# Patient Record
Sex: MALE | Race: WHITE | Employment: FULL TIME | URBAN - METROPOLITAN AREA
[De-identification: names, ages, dates, MRNs, and addresses within clinical notes are randomized per-mention and may not be internally consistent; named-entity substitution may affect disease eponyms.]

---

## 2023-08-30 ENCOUNTER — APPOINTMENT (OUTPATIENT)
Dept: GENERAL RADIOLOGY | Age: 58
End: 2023-08-30
Payer: COMMERCIAL

## 2023-08-30 ENCOUNTER — APPOINTMENT (OUTPATIENT)
Dept: CT IMAGING | Age: 58
End: 2023-08-30
Payer: COMMERCIAL

## 2023-08-30 ENCOUNTER — APPOINTMENT (OUTPATIENT)
Dept: MRI IMAGING | Age: 58
End: 2023-08-30
Attending: INTERNAL MEDICINE
Payer: COMMERCIAL

## 2023-08-30 ENCOUNTER — HOSPITAL ENCOUNTER (INPATIENT)
Age: 58
LOS: 2 days | Discharge: HOME | End: 2023-09-01
Attending: INTERNAL MEDICINE | Admitting: INTERNAL MEDICINE
Payer: COMMERCIAL

## 2023-08-30 ENCOUNTER — HOSPITAL ENCOUNTER (EMERGENCY)
Age: 58
Discharge: ANOTHER HOSPITAL | End: 2023-08-30
Attending: EMERGENCY MEDICINE
Payer: COMMERCIAL

## 2023-08-30 VITALS
HEIGHT: 73 IN | DIASTOLIC BLOOD PRESSURE: 78 MMHG | WEIGHT: 265 LBS | TEMPERATURE: 98.3 F | BODY MASS INDEX: 35.12 KG/M2 | HEART RATE: 69 BPM | RESPIRATION RATE: 14 BRPM | SYSTOLIC BLOOD PRESSURE: 141 MMHG | OXYGEN SATURATION: 96 %

## 2023-08-30 DIAGNOSIS — I63.50 POSTERIOR CIRCULATION STROKE (HCC): Primary | ICD-10-CM

## 2023-08-30 DIAGNOSIS — I63.9 ACUTE CVA (CEREBROVASCULAR ACCIDENT) (HCC): Primary | ICD-10-CM

## 2023-08-30 DIAGNOSIS — G47.34 IDIOPATHIC SLEEP RELATED NONOBSTRUCTIVE ALVEOLAR HYPOVENTILATION: ICD-10-CM

## 2023-08-30 DIAGNOSIS — G47.39 OTHER SLEEP APNEA: ICD-10-CM

## 2023-08-30 PROBLEM — I61.9 CVA (CEREBROVASCULAR ACCIDENT DUE TO INTRACEREBRAL HEMORRHAGE) (HCC): Status: ACTIVE | Noted: 2023-08-30

## 2023-08-30 LAB
ALBUMIN SERPL-MCNC: 4.5 G/DL (ref 3.4–5)
ALBUMIN/GLOB SERPL: 2 {RATIO} (ref 1.1–2.2)
ALP SERPL-CCNC: 117 U/L (ref 40–129)
ALT SERPL-CCNC: 56 U/L (ref 10–40)
ANION GAP SERPL CALCULATED.3IONS-SCNC: 15 MMOL/L (ref 3–16)
AST SERPL-CCNC: 41 U/L (ref 15–37)
BASOPHILS # BLD: 0.1 K/UL (ref 0–0.2)
BASOPHILS NFR BLD: 0.6 %
BILIRUB SERPL-MCNC: 0.8 MG/DL (ref 0–1)
BUN SERPL-MCNC: 10 MG/DL (ref 7–20)
CALCIUM SERPL-MCNC: 9.5 MG/DL (ref 8.3–10.6)
CHLORIDE SERPL-SCNC: 101 MMOL/L (ref 99–110)
CO2 SERPL-SCNC: 24 MMOL/L (ref 21–32)
CREAT SERPL-MCNC: 0.7 MG/DL (ref 0.9–1.3)
DEPRECATED RDW RBC AUTO: 12.8 % (ref 12.4–15.4)
EKG ATRIAL RATE: 69 BPM
EKG DIAGNOSIS: NORMAL
EKG P AXIS: 43 DEGREES
EKG P-R INTERVAL: 154 MS
EKG Q-T INTERVAL: 396 MS
EKG QRS DURATION: 100 MS
EKG QTC CALCULATION (BAZETT): 424 MS
EKG R AXIS: 63 DEGREES
EKG T AXIS: 63 DEGREES
EKG VENTRICULAR RATE: 69 BPM
EOSINOPHIL # BLD: 0.3 K/UL (ref 0–0.6)
EOSINOPHIL NFR BLD: 2.4 %
ETHANOLAMINE SERPL-MCNC: NORMAL MG/DL (ref 0–0.08)
GFR SERPLBLD CREATININE-BSD FMLA CKD-EPI: >60 ML/MIN/{1.73_M2}
GLUCOSE BLD-MCNC: 166 MG/DL (ref 70–99)
GLUCOSE SERPL-MCNC: 170 MG/DL (ref 70–99)
HCT VFR BLD AUTO: 46.1 % (ref 40.5–52.5)
HGB BLD-MCNC: 16.1 G/DL (ref 13.5–17.5)
INR PPP: 0.93 (ref 0.84–1.16)
LYMPHOCYTES # BLD: 3 K/UL (ref 1–5.1)
LYMPHOCYTES NFR BLD: 25.7 %
MCH RBC QN AUTO: 33.9 PG (ref 26–34)
MCHC RBC AUTO-ENTMCNC: 35 G/DL (ref 31–36)
MCV RBC AUTO: 96.8 FL (ref 80–100)
MONOCYTES # BLD: 1 K/UL (ref 0–1.3)
MONOCYTES NFR BLD: 8.8 %
NEUTROPHILS # BLD: 7.3 K/UL (ref 1.7–7.7)
NEUTROPHILS NFR BLD: 62.5 %
PERFORMED ON: ABNORMAL
PLATELET # BLD AUTO: 265 K/UL (ref 135–450)
PMV BLD AUTO: 8.7 FL (ref 5–10.5)
POTASSIUM SERPL-SCNC: 3.7 MMOL/L (ref 3.5–5.1)
PROT SERPL-MCNC: 6.8 G/DL (ref 6.4–8.2)
PROTHROMBIN TIME: 12.5 SEC (ref 11.5–14.8)
RBC # BLD AUTO: 4.76 M/UL (ref 4.2–5.9)
SODIUM SERPL-SCNC: 140 MMOL/L (ref 136–145)
TROPONIN, HIGH SENSITIVITY: 10 NG/L (ref 0–22)
WBC # BLD AUTO: 11.6 K/UL (ref 4–11)

## 2023-08-30 PROCEDURE — 36415 COLL VENOUS BLD VENIPUNCTURE: CPT

## 2023-08-30 PROCEDURE — 6360000004 HC RX CONTRAST MEDICATION: Performed by: EMERGENCY MEDICINE

## 2023-08-30 PROCEDURE — 70498 CT ANGIOGRAPHY NECK: CPT

## 2023-08-30 PROCEDURE — 6360000002 HC RX W HCPCS: Performed by: STUDENT IN AN ORGANIZED HEALTH CARE EDUCATION/TRAINING PROGRAM

## 2023-08-30 PROCEDURE — 1200000000 HC SEMI PRIVATE

## 2023-08-30 PROCEDURE — 85025 COMPLETE CBC W/AUTO DIFF WBC: CPT

## 2023-08-30 PROCEDURE — 6360000002 HC RX W HCPCS

## 2023-08-30 PROCEDURE — 93010 ELECTROCARDIOGRAM REPORT: CPT | Performed by: INTERNAL MEDICINE

## 2023-08-30 PROCEDURE — 71045 X-RAY EXAM CHEST 1 VIEW: CPT

## 2023-08-30 PROCEDURE — 70450 CT HEAD/BRAIN W/O DYE: CPT

## 2023-08-30 PROCEDURE — 99285 EMERGENCY DEPT VISIT HI MDM: CPT

## 2023-08-30 PROCEDURE — 2580000003 HC RX 258: Performed by: STUDENT IN AN ORGANIZED HEALTH CARE EDUCATION/TRAINING PROGRAM

## 2023-08-30 PROCEDURE — 80053 COMPREHEN METABOLIC PANEL: CPT

## 2023-08-30 PROCEDURE — 84484 ASSAY OF TROPONIN QUANT: CPT

## 2023-08-30 PROCEDURE — HZ2ZZZZ DETOXIFICATION SERVICES FOR SUBSTANCE ABUSE TREATMENT: ICD-10-PCS | Performed by: INTERNAL MEDICINE

## 2023-08-30 PROCEDURE — 85610 PROTHROMBIN TIME: CPT

## 2023-08-30 PROCEDURE — 82077 ASSAY SPEC XCP UR&BREATH IA: CPT

## 2023-08-30 PROCEDURE — 70551 MRI BRAIN STEM W/O DYE: CPT

## 2023-08-30 PROCEDURE — 93005 ELECTROCARDIOGRAM TRACING: CPT | Performed by: EMERGENCY MEDICINE

## 2023-08-30 RX ORDER — LABETALOL HYDROCHLORIDE 5 MG/ML
5 INJECTION, SOLUTION INTRAVENOUS EVERY 10 MIN PRN
Status: DISCONTINUED | OUTPATIENT
Start: 2023-08-30 | End: 2023-09-01 | Stop reason: HOSPADM

## 2023-08-30 RX ORDER — SODIUM CHLORIDE 0.9 % (FLUSH) 0.9 %
5-40 SYRINGE (ML) INJECTION EVERY 12 HOURS SCHEDULED
Status: DISCONTINUED | OUTPATIENT
Start: 2023-08-30 | End: 2023-09-01 | Stop reason: HOSPADM

## 2023-08-30 RX ORDER — LABETALOL HYDROCHLORIDE 5 MG/ML
20 INJECTION, SOLUTION INTRAVENOUS ONCE
Status: DISCONTINUED | OUTPATIENT
Start: 2023-08-30 | End: 2023-08-30 | Stop reason: HOSPADM

## 2023-08-30 RX ORDER — THIAMINE HYDROCHLORIDE 100 MG/ML
100 INJECTION, SOLUTION INTRAMUSCULAR; INTRAVENOUS ONCE
Status: COMPLETED | OUTPATIENT
Start: 2023-08-30 | End: 2023-08-30

## 2023-08-30 RX ORDER — SODIUM CHLORIDE 9 MG/ML
INJECTION, SOLUTION INTRAVENOUS PRN
Status: DISCONTINUED | OUTPATIENT
Start: 2023-08-30 | End: 2023-09-01 | Stop reason: HOSPADM

## 2023-08-30 RX ORDER — ATENOLOL 50 MG/1
50 TABLET ORAL DAILY
COMMUNITY

## 2023-08-30 RX ORDER — ROSUVASTATIN CALCIUM 20 MG/1
40 TABLET, COATED ORAL NIGHTLY
Status: DISCONTINUED | OUTPATIENT
Start: 2023-08-31 | End: 2023-08-31

## 2023-08-30 RX ORDER — SODIUM CHLORIDE 0.9 % (FLUSH) 0.9 %
5-40 SYRINGE (ML) INJECTION PRN
Status: DISCONTINUED | OUTPATIENT
Start: 2023-08-30 | End: 2023-09-01 | Stop reason: HOSPADM

## 2023-08-30 RX ORDER — ONDANSETRON 2 MG/ML
4 INJECTION INTRAMUSCULAR; INTRAVENOUS EVERY 6 HOURS PRN
Status: DISCONTINUED | OUTPATIENT
Start: 2023-08-30 | End: 2023-09-01 | Stop reason: HOSPADM

## 2023-08-30 RX ORDER — ALLOPURINOL 300 MG/1
300 TABLET ORAL DAILY
COMMUNITY

## 2023-08-30 RX ORDER — ONDANSETRON 4 MG/1
4 TABLET, ORALLY DISINTEGRATING ORAL EVERY 8 HOURS PRN
Status: DISCONTINUED | OUTPATIENT
Start: 2023-08-30 | End: 2023-09-01 | Stop reason: HOSPADM

## 2023-08-30 RX ORDER — ROSUVASTATIN CALCIUM 20 MG/1
40 TABLET, COATED ORAL NIGHTLY
Status: DISCONTINUED | OUTPATIENT
Start: 2023-08-30 | End: 2023-08-30

## 2023-08-30 RX ORDER — POLYETHYLENE GLYCOL 3350 17 G/17G
17 POWDER, FOR SOLUTION ORAL DAILY PRN
Status: DISCONTINUED | OUTPATIENT
Start: 2023-08-30 | End: 2023-09-01 | Stop reason: HOSPADM

## 2023-08-30 RX ORDER — ONDANSETRON 2 MG/ML
INJECTION INTRAMUSCULAR; INTRAVENOUS
Status: COMPLETED
Start: 2023-08-30 | End: 2023-08-30

## 2023-08-30 RX ADMIN — SODIUM CHLORIDE, PRESERVATIVE FREE 10 ML: 5 INJECTION INTRAVENOUS at 21:58

## 2023-08-30 RX ADMIN — SODIUM CHLORIDE, PRESERVATIVE FREE 10 ML: 5 INJECTION INTRAVENOUS at 21:57

## 2023-08-30 RX ADMIN — ONDANSETRON HYDROCHLORIDE 4 MG: 2 INJECTION, SOLUTION INTRAMUSCULAR; INTRAVENOUS at 14:06

## 2023-08-30 RX ADMIN — IOMEPROL INJECTION 75 ML: 714 INJECTION, SOLUTION INTRAVASCULAR at 13:48

## 2023-08-30 RX ADMIN — THIAMINE HYDROCHLORIDE 100 MG: 100 INJECTION, SOLUTION INTRAMUSCULAR; INTRAVENOUS at 21:57

## 2023-08-30 ASSESSMENT — ENCOUNTER SYMPTOMS
COLOR CHANGE: 0
SHORTNESS OF BREATH: 0
VOMITING: 0
SHORTNESS OF BREATH: 0
DIARRHEA: 0
ABDOMINAL PAIN: 0
NAUSEA: 0
BACK PAIN: 0
COUGH: 0
EYE PAIN: 0
WHEEZING: 0
SORE THROAT: 0

## 2023-08-30 ASSESSMENT — LIFESTYLE VARIABLES
HOW OFTEN DO YOU HAVE A DRINK CONTAINING ALCOHOL: MONTHLY OR LESS
HOW MANY STANDARD DRINKS CONTAINING ALCOHOL DO YOU HAVE ON A TYPICAL DAY: 1 OR 2

## 2023-08-30 ASSESSMENT — PAIN - FUNCTIONAL ASSESSMENT
PAIN_FUNCTIONAL_ASSESSMENT: NONE - DENIES PAIN

## 2023-08-30 NOTE — ED NOTES
Pt transported out of ER in stable condition by [facility]. Attempted to call report no answer at this time.       Linda Lee RN  08/30/23 7466

## 2023-08-30 NOTE — CONSULTS
Stroke Team Brief Plan of Care Note:  Discussed Mr. Shital Olmedo' care w/ Dr. Tomas Reddy. Briefly, this is a 56year old gentleman with HTN, who presented with acute onset dizziness starting 1 hour prior to arrival (described dizziness as just inability to keep balance). Of note, had a brief episode (20 seconds) of same type of dizziness early in the morning that completely resolved. On evaluation, NIHSS 0, however, there was probable direction changing nystagmus and truncal ataxia as evidenced by wide based gait and unsteadiness walking (but able to take 3-4 steps without assistance or falling). He had no trouble swallowing. /97, . CTH demonstrated subtle punctate foci of hyperdensity in R and L corona radiata, differential for which was hyperdense clot vs blood. This seems unlikely to represent an intracranial hemorrhage but given relatively mild symptoms (able to walk without assistance and no dysphagia) and CT findings, decision was made not to treat with TNK. Recommended MRI to follow-up for stroke evaluation and further characterization of hyperdensities.      Please do not hesitate to reach out with further concerns or questions ([phone])    Nathanel Kocher, DO   Stroke Team

## 2023-08-30 NOTE — ED NOTES
Pt returned to tx area via cot without incident. Remains alert and without status changes. Pt being interviewed per [facility] Stroke Team via Telest[facility] per their request. Pts wife to bedside.  Dr Vj Anderson remains @ bedside     Evi Soto RN  08/30/23 5155

## 2023-08-30 NOTE — ED PROVIDER NOTES
hallway as we were going to CT revealed that he had very significant nystagmus I think that he may even have component of some rotary nystagmus it is bilateral  Patient gait was not tested initially but was described by EMS as he walked like a drunken  and 2 people had to hang onto him he was moving all 4 extremities and officially his NIH stroke score was 0  Physical exam: Vital signs were slightly hypertensive he was given intravenous labetalol  Continuous cardiac monitoring was performed. Patient's EKG was a sinus rhythm. Patient had no ectopy. No other acute abnormalities were appreciated. Chest x-ray unremarkable. I did talk to the stroke team initially they were planning on giving TNK however after the read from the radiologist came back about a questionable hyperdense lesion it was felt that may be that further work-up in house would be more appropriate I did talk to the transfer center with multiple calls. Finally we did talk to nurse practitioner Haja Kaur at [facility] flew did not think that it was a bleed. But at this point I think we are going to stay firm on the radiology report as being questionable therefore it would not be safe to give TNK. Without further work-up she did agree to accept the patient to be transferred to [facility] for further work-up. I then did talk to the hospitalist with escalating the patient's care to a monitored bed at [facility] Dr. Aleisha Louise patient's labs were all ordered and reviewed. Patient remains symptomatic. We did do teleconferencing with the stroke team also we were able to examine the patient. Patient underwent 55 minutes of critical care time for acute cerebrovascular accident patient is being transferred to [facility] for MRI and further work-up. Patient had no procedure time  EKG was interpreted by myself without the assistance of cardiology. Patient has a normal sinus rhythm. No acute ischemic injury pattern.   No ectopy  Normal sinus rhythm      REASSESSMENT CRITICAL CARE TIME     CONSULTS:  None      PROCEDURES:     Procedures    MEDICATIONS GIVEN THIS VISIT:  Medications   labetalol (NORMODYNE;TRANDATE) injection 20 mg (has no administration in time range)   iomeprol (IOMERON 350) 71.44 % injection 75 mL (75 mLs IntraVENous Given 8/30/23 1348)   ondansetron (ZOFRAN) 4 MG/2ML injection (4 mg  Given 8/30/23 1406)        FINAL IMPRESSION      1. Posterior circulation stroke [facility])            DISPOSITION/PLAN   DISPOSITION Decision To Transfer 08/30/2023 04:28:40 PM          Controlled Substances Monitoring  No flowsheet data found. (Please note that portions of this note were completed with a voice recognition program.  Efforts were made to edit the dictations but occasionally words are mis-transcribed.)    Patient was advised to return to the Emergency Department if there was any worsening.     Benito Jaime MD (electronically signed)  Attending Emergency Physician           Benito Jaime MD  08/30/23 5513

## 2023-08-30 NOTE — ED NOTES
Pt to ER via EMS with reported sudden onset of dizziness with slurred speech, weakness & L sided facial drooping that began @ approx 1257 today. Upon arrival to ER, pt states dizziness remaining, but pt without facial drooping or slurred speech. Dr Aries Prabhakar met EMS upon their entry to ER and NIHSS preformed. Pt taken directly to CT per verbal order of Dr Aries Prabhakar.  Pt taken to CT without incident     Estephania Coronel RN  08/30/23 0424

## 2023-08-31 LAB
ANION GAP SERPL CALCULATED.3IONS-SCNC: 14 MMOL/L (ref 3–16)
BUN SERPL-MCNC: 11 MG/DL (ref 7–20)
CALCIUM SERPL-MCNC: 10.3 MG/DL (ref 8.3–10.6)
CHLORIDE SERPL-SCNC: 101 MMOL/L (ref 99–110)
CHOLEST SERPL-MCNC: 165 MG/DL (ref 0–199)
CO2 SERPL-SCNC: 26 MMOL/L (ref 21–32)
CREAT SERPL-MCNC: 0.8 MG/DL (ref 0.9–1.3)
DEPRECATED RDW RBC AUTO: 13.3 % (ref 12.4–15.4)
GFR SERPLBLD CREATININE-BSD FMLA CKD-EPI: >60 ML/MIN/{1.73_M2}
GLUCOSE SERPL-MCNC: 106 MG/DL (ref 70–99)
HCT VFR BLD AUTO: 42.9 % (ref 40.5–52.5)
HDLC SERPL-MCNC: 34 MG/DL (ref 40–60)
HGB BLD-MCNC: 15.6 G/DL (ref 13.5–17.5)
LDLC SERPL CALC-MCNC: 96 MG/DL
MCH RBC QN AUTO: 35 PG (ref 26–34)
MCHC RBC AUTO-ENTMCNC: 36.3 G/DL (ref 31–36)
MCV RBC AUTO: 96.5 FL (ref 80–100)
PLATELET # BLD AUTO: 188 K/UL (ref 135–450)
PMV BLD AUTO: 8.8 FL (ref 5–10.5)
POTASSIUM SERPL-SCNC: 4.2 MMOL/L (ref 3.5–5.1)
RBC # BLD AUTO: 4.45 M/UL (ref 4.2–5.9)
SODIUM SERPL-SCNC: 141 MMOL/L (ref 136–145)
TRIGL SERPL-MCNC: 177 MG/DL (ref 0–150)
VLDLC SERPL CALC-MCNC: 35 MG/DL
WBC # BLD AUTO: 10.1 K/UL (ref 4–11)

## 2023-08-31 PROCEDURE — 97530 THERAPEUTIC ACTIVITIES: CPT

## 2023-08-31 PROCEDURE — 82746 ASSAY OF FOLIC ACID SERUM: CPT

## 2023-08-31 PROCEDURE — 6370000000 HC RX 637 (ALT 250 FOR IP)

## 2023-08-31 PROCEDURE — 80061 LIPID PANEL: CPT

## 2023-08-31 PROCEDURE — 83036 HEMOGLOBIN GLYCOSYLATED A1C: CPT

## 2023-08-31 PROCEDURE — 97161 PT EVAL LOW COMPLEX 20 MIN: CPT

## 2023-08-31 PROCEDURE — 97535 SELF CARE MNGMENT TRAINING: CPT

## 2023-08-31 PROCEDURE — 2580000003 HC RX 258: Performed by: STUDENT IN AN ORGANIZED HEALTH CARE EDUCATION/TRAINING PROGRAM

## 2023-08-31 PROCEDURE — 97116 GAIT TRAINING THERAPY: CPT

## 2023-08-31 PROCEDURE — 36415 COLL VENOUS BLD VENIPUNCTURE: CPT

## 2023-08-31 PROCEDURE — 84425 ASSAY OF VITAMIN B-1: CPT

## 2023-08-31 PROCEDURE — 84439 ASSAY OF FREE THYROXINE: CPT

## 2023-08-31 PROCEDURE — 2060000000 HC ICU INTERMEDIATE R&B

## 2023-08-31 PROCEDURE — 80048 BASIC METABOLIC PNL TOTAL CA: CPT

## 2023-08-31 PROCEDURE — 97166 OT EVAL MOD COMPLEX 45 MIN: CPT

## 2023-08-31 PROCEDURE — 84443 ASSAY THYROID STIM HORMONE: CPT

## 2023-08-31 PROCEDURE — 92610 EVALUATE SWALLOWING FUNCTION: CPT

## 2023-08-31 PROCEDURE — 85027 COMPLETE CBC AUTOMATED: CPT

## 2023-08-31 RX ORDER — ASPIRIN 81 MG/1
81 TABLET, CHEWABLE ORAL DAILY
Status: DISCONTINUED | OUTPATIENT
Start: 2023-08-31 | End: 2023-09-01 | Stop reason: HOSPADM

## 2023-08-31 RX ORDER — MULTIVITAMIN WITH IRON
1 TABLET ORAL DAILY
Status: DISCONTINUED | OUTPATIENT
Start: 2023-08-31 | End: 2023-09-01 | Stop reason: HOSPADM

## 2023-08-31 RX ORDER — ATENOLOL 50 MG/1
50 TABLET ORAL DAILY
Status: DISCONTINUED | OUTPATIENT
Start: 2023-08-31 | End: 2023-09-01 | Stop reason: HOSPADM

## 2023-08-31 RX ORDER — ATORVASTATIN CALCIUM 80 MG/1
80 TABLET, FILM COATED ORAL NIGHTLY
Status: DISCONTINUED | OUTPATIENT
Start: 2023-08-31 | End: 2023-09-01 | Stop reason: HOSPADM

## 2023-08-31 RX ORDER — ALLOPURINOL 300 MG/1
300 TABLET ORAL DAILY
Status: DISCONTINUED | OUTPATIENT
Start: 2023-08-31 | End: 2023-09-01 | Stop reason: HOSPADM

## 2023-08-31 RX ORDER — GAUZE BANDAGE 2" X 2"
100 BANDAGE TOPICAL DAILY
Status: DISCONTINUED | OUTPATIENT
Start: 2023-08-31 | End: 2023-09-01 | Stop reason: HOSPADM

## 2023-08-31 RX ADMIN — ASPIRIN 81 MG: 81 TABLET, CHEWABLE ORAL at 14:16

## 2023-08-31 RX ADMIN — SODIUM CHLORIDE, PRESERVATIVE FREE 10 ML: 5 INJECTION INTRAVENOUS at 08:19

## 2023-08-31 RX ADMIN — THERA TABS 1 TABLET: TAB at 08:18

## 2023-08-31 RX ADMIN — Medication 100 MG: at 08:18

## 2023-08-31 RX ADMIN — ATENOLOL 50 MG: 50 TABLET ORAL at 08:18

## 2023-08-31 RX ADMIN — SODIUM CHLORIDE, PRESERVATIVE FREE 10 ML: 5 INJECTION INTRAVENOUS at 21:25

## 2023-08-31 RX ADMIN — ALLOPURINOL 300 MG: 300 TABLET ORAL at 08:18

## 2023-08-31 RX ADMIN — ATORVASTATIN CALCIUM 80 MG: 80 TABLET, FILM COATED ORAL at 21:25

## 2023-08-31 NOTE — PLAN OF CARE
Problem: Discharge Planning  Goal: Discharge to home or other facility with appropriate resources  8/31/2023 0746 by Librado Damon RN  Outcome: Progressing   Pt involved in discharge planning. Barriers to discharge discussed with patient. Discharge learning needs identified. Discuss with patient any additional needed resources and transportation plans. Case management following plan of care. Problem: Safety - Adult  Goal: Free from fall injury  8/31/2023 0746 by Librado Damon RN  Outcome: Progressing   All fall precautions in place. Bed locked and in lowest position with alarm on. Overbed table and personal belonings within reach. Call light within reach and patient instructed to use call light for assistance. Non-skid socks on.

## 2023-08-31 NOTE — PROGRESS NOTES
Patient is alert and oriented x4. VSS on RA. Patient is voiding adequately via urinal. No acute neuro changes noted to patient. Patient denies pain. Standard safety measures in place. Plan of care ongoing.

## 2023-08-31 NOTE — PROGRESS NOTES
4 Eyes Skin Assessment     NAME:  Italia Meyers  YOB: 1965  MEDICAL RECORD NUMBER:  [id number]    The patient is being assessed for  Admission    I agree that at least one RN has performed a thorough Head to Toe Skin Assessment on the patient. ALL assessment sites listed below have been assessed. Areas assessed by both nurses:    Head, Face, Ears, Shoulders, Back, Chest, Arms, Elbows, Hands, Sacrum. Buttock, Coccyx, Ischium, Legs. Feet and Heels, Under Medical Devices , and Other          Does the Patient have a Wound?  No noted wound(s)     Birthmark on back   [place]an Prevention initiated by RN: Yes  Wound Care Orders initiated by RN: No    Pressure Injury (Stage 3,4, Unstageable, DTI, NWPT, and Complex wounds) if present, place Wound referral order by RN under : No    New Ostomies, if present place, Ostomy referral order under : No     Nurse 1 eSignature: Electronically signed by Ander Velez RN on 8/31/23 at 1:08 AM EDT    **SHARE this note so that the co-signing nurse can place an eSignature**    Nurse 2 eSignature: Electronically signed by Mono Ralph RN on 8/31/23 at 2:16 AM EDT

## 2023-08-31 NOTE — PROGRESS NOTES
Speech Language Pathology  Facility/Department:[facility] 5T ORTHO/NEURO  Bedside Swallowing  Evaluation  Discharge   Name: Deven Wong  : 1965  MRN: [id number]                                                         Patient Diagnosis(es):   Patient Active Problem List    Diagnosis Date Noted    CVA (cerebrovascular accident due to intracerebral hemorrhage) ([address]) 2023    Acute CVA (cerebrovascular accident) ([address]) 2023       Past Medical History:   Diagnosis Date    Back pain, chronic     Gout     Hypertension     Stutter      Past Surgical History:   Procedure Laterality Date    APPENDECTOMY         Reason for Referral:  Deven Wong  was referred for a Speech Therapy evaluation to assess swallow function and/or communication. History of Present Illness  Per MD notes:  Deven Wong is a 56 y.o. male who presents to the emergency department      Patient presented to the emergency department history of a having a brief episode of dizziness way early this morning around 7 AM.  Lasted less than 20 seconds and then went away but then returned at about 12:30 PM this afternoon finally at about 1250 called [facility] they did bring him out here he had very significant trouble walking to EMS people had to hold onto him he was described as being ataxic like a drunken     Imaging  MRI BRAIN WO CONTRAST   Final Result      1. No evidence of acute ischemia, intracranial hemorrhage, or mass effect. 2.  Small areas of parenchymal signal abnormality in the right greater than left   corona radiata possibly representing small remote infarcts. Date of onset: 23    Current Diet:  ADULT DIET; Regular; Low Sodium (2 gm)      Treatment Diagnosis: dysfluency     Pain:  Denied pain    General:  Chart reviewed: Yes  Behavior/Cognition: WFL  Communication Observation: pt reported a history of stuttering.  Pt with instances of dysfluency, oral groping and facial ticks

## 2023-08-31 NOTE — PROGRESS NOTES
Patient is alert and oriented x4. VSS on room air. No changes in mental status this shift. No complaints of pain this shift, continuing to monitor and manage per [facility]. Ambulating x1 with gait belt. Tolerating well. Patient voiding well via urinal and BRP. One bm today. Patient worked with therapy today and tolerated well, sat up in chair for brief time. Tolerating diet well. Patient is currently resting in bed with bed alarm on for safety. Call light within reach and all fall precautions in place.     Electronically signed by Elaine Ledbetter RN on 8/31/2023 at 6:24 PM

## 2023-08-31 NOTE — NURSE NAVIGATOR
NAME:  Leopold Gimenez BIRTH:  1965  MEDICAL RECORD NUMBER:  [id number]  TODAYS DATE:  8/31/2023    Discussed personal risk factors for Stroke/TIA with patient/family, and ways to reduce the risk for a recurrent stroke. Patient's personal risk factors which were identified are:     [x] Alcohol Abuse: check with your physician before any alcohol consumption - Moderation encouraged   [] Atrial fibrillation: may cause blood clots. [] Drug Abuse: Seek help, talk with your doctor  [] Clotting Disorder  [] Diabetes  [] Family history of stroke or heart disease  [x] High Blood Pressure/Hypertension: work with your physician. [x] High cholesterol: monitor cholesterol levels with your physician. [x] Overweight/Obesity: work with your physician for your ideal body weight. [x] Physical Inactivity: get regular exercise as directed by your physician. [] Personal history of previous TIA or stroke  [x] Poor Diet; decrease salt (sodium) in your diet, follow diet directed by physician. [x] Smoking: Cigarette/Cigar: stop smoking. Pt reports \"vaping\" - cessation encouraged    Advised pt. that you can reduce your risk for stroke/TIA by modifying/controlling the risk factors that you have. Pt.advised to take the medications as prescribed, which will be detailed in the discharge instructions, and to not stop taking them without consulting their physician. In addition, pt. advised to maintain a healthy diet, exercise regularly and to not smoke. [employer] Stroke treatment and prevention, Managing your recovery  notebook  provided and/or reviewed  with patient/family.   The notebook includes, but not limited to, sections addressing warning signs & symptoms of a stroke, which are: sudden numbness or weakness especially on one side of the body, sudden confusion, difficulty speaking or understanding, sudden changes in vision, sudden dizziness or loss of balance/ coordination, sudden severe headache, syncope and

## 2023-08-31 NOTE — PLAN OF CARE
Problem: Discharge Planning  Goal: Discharge to home or other facility with appropriate resources  Outcome: Progressing  Flowsheets (Taken 8/31/2023 0106)  Discharge to home or other facility with appropriate resources:   Identify barriers to discharge with patient and caregiver   Arrange for needed discharge resources and transportation as appropriate     Problem: Safety - Adult  Goal: Free from fall injury  Outcome: Progressing  Flowsheets (Taken 8/31/2023 0106)  Free From Fall Injury:   Instruct family/caregiver on patient safety   Based on caregiver fall risk screen, instruct family/caregiver to ask for assistance with transferring infant if caregiver noted to have fall risk factors

## 2023-08-31 NOTE — CONSULTS
Neurology / Patricia Gains Note      Taran Dos Santos MD is requesting this consult. Reason for Consult: CVA  Admission Chief Complaint: dizziness    History of Present Illness     Susie Falcon III is a 56 y.o. y/o male with history significant for HTN and gout who presented to the ED at [facility] for CC of dizziness. His symptoms first presented early morning resulting in his calling off from work but resolved shortly after and not severe. Dizziness returned around noon [facility]). He described his dizziness as the room spinning. He had trouble with his balance, mild, frontal headache, \"numbness and weakness all over\" and blurry vision but denied any focal weakness, dyarthria, dysphagia or other symptoms. In the [facility] ED, NIHSS was 0. CTH was concerning for thin linear hyperdensity in the right corona radiata, measuring  approximately 7 mm in length, with some minimal surrounding white matter edema. CTA neck was unremarkable. Decision was made to not treat with TNK. Patient was transferred to [facility]. MRI showed no evidence of acute ischemia, intracranial hemorrhage, or mass effect. The small areas of parenchymal signal abnormality in the right greater than left corona radiata likely represented small remote infarcts. Per my interview with the patient, he reports feeling back to normal but is nervous about symptoms returning. He states symptoms had resolved a few hours after arriving to ED. He denies any HA, blurred vision, dysarthria, dysphagia, weakness, numbness, hearing loss, recent viral illness or other complaints this am. He does not recall whether head movements made vertigo worse but reports standing or walking did. He cannot comment on his balance as he has not been out of bed. Of note, he is a former smoker, quit in 2013. Drinks 3-4 beers (maximum 12) daily.     History provided by:  Patient and chart review    REVIEW OF SYSTEMS:   Constitutional- No weight loss or fevers   Neurologic- denies any Metabolic Panel Recent Labs     08/30/23  1333      K 3.7      CO2 24   BUN 10   CREATININE 0.7*   GLUCOSE 170*   CALCIUM 9.5   LABALBU 4.5   ALKPHOS 117   ALT 56*   AST 41*      CBC / Coags Recent Labs     08/30/23  1333   WBC 11.6*   RBC 4.76   HGB 16.1   HCT 46.1      INR 0.93      Other No results for input(s): LABA1C, LDLCALC, TRIG, TSH, YABFOLMO59, FOLATE, LABSALI, COVID19 in the last 72 hours. No results for input(s): PHENYTOIN, KEPPRA, LACOSA, LAMO, VALPROATE, LACTSEPSIS, LACTA in the last 72 hours. CURRENT SCHEDULED MEDICATIONS   Inpatient Medications     multivitamin, 1 tablet, Oral, Daily    thiamine, 100 mg, Oral, Daily    allopurinol, 300 mg, Oral, Daily    atenolol, 50 mg, Oral, Daily    sodium chloride flush, 5-40 mL, IntraVENous, 2 times per day    sodium chloride flush, 5-40 mL, IntraVENous, 2 times per day   Infusions    sodium chloride      sodium chloride        Antibiotics   Recent Abx Admin        No antibiotic orders with administrations found. IMPRESSION & RECOMMENDATIONS     IMPRESSION:  49yoM with PMH HTN who presents with dizziness. MRI showed no acute stroke or hemorrhage but there was concern for small remote infarcts. Unclear if this episode if from BPPV or TIA, although TIA less likely.     RECOMMENDATIONS:  - Neurochecks every 4 hours  - PT/OT  and SLP  - Follow up hemoglobin a1c and lipid panel  - Echo with bubble study  - Start ASA and high intensity statin for secondary prevention given remote stroke  - Follow up as an outpatient      Management and plan discussed with:   Bedside nurse  Dr. Brian Beach MD   Neurology & Neurocritical Care   8/31/2023 7:45 AM    ICU Patients:   [facility]: [facility] Neurocritical Care  Floor / PCU Patients:  [facility]: [facility] Neurology

## 2023-08-31 NOTE — PROGRESS NOTES
Internal Medicine Progress Note    Date: 8/31/2023   Patient: [address] [facility] Day: 1      CC: Dizziness     Interval Hx   Pt states he is shaken up from his episode this morning though denies dizziness at this time. He states his sensation is intact and he feels like his normal self. PT/OT and SLP evaluations revealed no deficits. Echo, Sleep Study, TSH, and neurology consult ordered. HPI:   53yo M with pmhx of htn and gout presents with an episode of dizziness and lightheadedness early this am.  Pt states he was getting up for work when he had a brief episode of right sided headache with dizziness described as the room spinning. He then laid down on the couch for a few minutes with resolution of his symptoms. He denies cp, sob, N/V/D, abd pain, or loc during this episode though he does state that he felt cold and as though something was wrong. This episode was witnessed by his wife who suggested the patient call 911, however, the patient declined. He states he then slept for two hours before getting up to let out his dogs when he began to have a more intense episode of dizziness causing him to have to lean against the doorframe. He describes his dizziness as \"being in a cyclone with his feet spinning\". He denies falls or additional symptoms with this episode and was able to contact EMS on his own. Pt denies prior hx of similar episodes, stroke, MI, or prior head trauma. He drinks 3-4 beers after work each day and had a negative ethanol screen on presentation. 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 MRI on admission did not show acute signs of hemorrhage or ischemia, but did demonstrate small areas of parenchymal abnormalities in the corona radiata bilaterally with the right appearing larger than the left.       Of note:  pt has speech difficulties

## 2023-08-31 NOTE — NURSE NAVIGATOR
Patient's chart reviewed for Stroke Core Measures and additional needs:    [x]   VTE prophylaxis   [x]   Antithrombotic (if applicable) - Order requested by Navigator    [x]   Swallow screen prior to PO intake   [x]   Lipids / A1C ordered or resulted   [x]   Therapy ordered   [x]   Care plan and Education template    Navigator to continue to follow patient while admitted, to assist with follow up and discharge planning as needed.      Nurse eSignature: Electronically signed by Stanley Chandler RN on 8/31/23 at 9:14 AM EDT - Neuroscience Navigator

## 2023-08-31 NOTE — DISCHARGE INSTRUCTIONS
Important Reminders:  Schedule an appointment with your primary care physician, Dr. Pili Madden, within 2 weeks - [phone]. Schedule an appointment with neuropsychiatry, Dr. Aryan Wang, within 2 months - [phone]. Schedule an appointment with the [facility] for a sleep study within 1 month - [phone]. Start taking aspirin 81 mg once per day. Start taking atorvastatin 80 mg once per day. Attached are some ways to help cut back on drinking and smoking. You should return to the emergency department if your symptoms recur, worsen, or do not resolve. In addition, return if:  You have a fever (greater than 101 degrees F),  You have chest pain, shortness of breath, abdominal pain, or uncontrollable vomiting,  You are unable to eat or drink,  You pass out,  You have difficulty moving your arms or legs,   You have difficulty speaking or slurred speech,  Or, you have any concern that you feel needs acute physician evaluation. [facility] Stroke Program Survey  The [address] [place] [address] values your feedback related to your recent hospital visit and admission. We strive to improve our Neuroscience program to promote better outcomes and recoveries for all our patients. The anonymous survey below consists of a few questions that are related to your stay and around your Stroke diagnosis, treatment, and recovery. It is anonymous and has only a few questions. The estimated length of time needed to complete this survey is 3 minutes or less. Thank you for completing this survey!

## 2023-08-31 NOTE — PROGRESS NOTES
Occupational Therapy  Facility/Department: [facility] 5T ORTHO/NEURO  Occupational Therapy Initial Assessment/Treatment    Name: Alec Benson  : 1965  MRN: [id number]  Date of Service: 2023    Discharge Recommendations:   Alec Benson scored a 20/24 on the AM-PAC ADL Inpatient form. Current research shows that an AM-PAC score of 18 or greater is typically associated with a discharge to the patient's home setting. Based on the patient's AM-PAC score, and their current ADL deficits, it is recommended that the patient have 2-3 sessions per week of Occupational Therapy at d/c to increase the patient's independence. At this time, this patient demonstrates the endurance and safety to discharge  home with home services and a follow up treatment frequency of 2-3x/wk. Please see assessment section for further patient specific details. If patient discharges prior to next session this note will serve as a discharge summary. Please see below for the latest assessment towards goals. OT Equipment Recommendations  Equipment Needed: No       Patient Diagnosis(es): There were no encounter diagnoses. Past Medical History:  has a past medical history of Back pain, chronic, Gout, Hypertension, and Stutter. Past Surgical History:  has a past surgical history that includes Appendectomy. Treatment Diagnosis: Impaired functional mobility and ADL      Assessment   Performance deficits / Impairments: Decreased functional mobility ; Decreased ADL status; Decreased balance  Assessment: Pt presents with a mild deline in functional independence. Pt is from home with wife, independent and working. Pt feels most of his symptoms has resolved, but still feels light headed. Currently, pt req CG-SBA for mobility and ADL.   Will follow for acute OT  Treatment Diagnosis: Impaired functional mobility and ADL  Prognosis: Good  Decision Making: Medium Complexity  REQUIRES OT FOLLOW-UP: Yes  Activity Tolerance  Activity Tolerance:

## 2023-08-31 NOTE — PROGRESS NOTES
Physical Therapy  Facility/Department: [address] [place] [address]   Physical Therapy Initial Assessment    Name: Robina Love  : 1965  MRN: [id number]  Date of Service: 2023    Discharge Recommendations:  Robina Love scored a 20/24 on the AM-PAC short mobility form. Current research shows that an AM-PAC score of 18 or greater is typically associated with a discharge to the patient's home setting. Based on the patient's AM-PAC score and their current functional mobility deficits, it is recommended that the patient have 2-3 sessions per week of Physical Therapy at d/c to increase the patient's independence. At this time, this patient demonstrates the endurance and safety to discharge home. Please see assessment section for further patient specific details. If patient discharges prior to next session this note will serve as a discharge summary. Please see below for the latest assessment towards goals. PT Equipment Recommendations  Equipment Needed: No      Patient Diagnosis(es): There were no encounter diagnoses. Past Medical History:  has a past medical history of Back pain, chronic, Gout, Hypertension, and Stutter. Past Surgical History:  has a past surgical history that includes Appendectomy. Assessment   Body Structures, Functions, Activity Limitations Requiring Skilled Therapeutic Intervention: Decreased functional mobility ; Decreased balance  Assessment: Pt is a 56 y.o. with diagnosis of CVA presenting with decreased functional mobility. Pt is currently requiring CGA-SBA for all OOB mobility, which is a decline from reported baseline. Pt will benefit from skilled therapy to maximize safety and independence. Anticipate good progress throughout IP stay. PT recommends 24 hr supervision/assist for increased safety.   Treatment Diagnosis: decreased functional mobility due to CVA  Therapy Prognosis: Good  Decision Making: Low Complexity  Requires PT Follow-Up: Yes  Activity

## 2023-08-31 NOTE — CARE COORDINATION
Case Management Assessment  Initial Evaluation    Date/Time of Evaluation: 8/31/2023 4:16 PM  Assessment Completed by: Jeannine Montoya RN    If patient is discharged prior to next notation, then this note serves as note for discharge by case management. Patient Name: Santhosh Gonzales                   YOB: 1965  Diagnosis: CVA (cerebrovascular accident due to intracerebral hemorrhage) ([address] [facility]) [I61.9]  Acute CVA (cerebrovascular accident) [facility]) [I63.9]                   Date / Time: 8/30/2023  8:06 PM    Patient Admission Status: Inpatient   Readmission Risk (Low < 19, Mod (19-27), High > 27): Readmission Risk Score: 4.3    Current PCP: Valli Ahumada, MD  PCP verified by CM? No    Chart Reviewed: Yes      History Provided by: Patient  Patient Orientation: Alert and Oriented    Patient Cognition: Alert    Hospitalization in the last 30 days (Readmission):  No    If yes, Readmission Assessment in CM Navigator will be completed. Advance Directives:      Code Status: Full Code   Patient's Primary Decision Maker is: Legal Next of Kin      Discharge Planning:    Patient lives with: Spouse/Significant Other Type of Home: House  Primary Care Giver: Self  Patient Support Systems include: Spouse/Significant Other   Current Financial resources: Medicaid  Current community resources: None  Current services prior to admission: None            Current DME:              Type of Home Care services:  None    ADLS  Prior functional level: Independent in ADLs/IADLs  Current functional level: Independent in ADLs/IADLs    PT AM-PAC: 20 /24  OT AM-PAC: 20 /24    Family can provide assistance at DC: Yes  Would you like Case Management to discuss the discharge plan with any other family members/significant others, and if so, who?  No  Plans to Return to Present Housing: Yes  Other Identified Issues/Barriers to RETURNING to current housing: none  Potential Assistance needed at discharge: N/A            Potential DME:

## 2023-09-01 VITALS
HEIGHT: 73 IN | TEMPERATURE: 97.8 F | RESPIRATION RATE: 18 BRPM | OXYGEN SATURATION: 96 % | BODY MASS INDEX: 34.48 KG/M2 | SYSTOLIC BLOOD PRESSURE: 122 MMHG | WEIGHT: 260.14 LBS | HEART RATE: 67 BPM | DIASTOLIC BLOOD PRESSURE: 77 MMHG

## 2023-09-01 LAB
ANION GAP SERPL CALCULATED.3IONS-SCNC: 8 MMOL/L (ref 3–16)
BUN SERPL-MCNC: 11 MG/DL (ref 7–20)
CALCIUM SERPL-MCNC: 9.8 MG/DL (ref 8.3–10.6)
CHLORIDE SERPL-SCNC: 101 MMOL/L (ref 99–110)
CO2 SERPL-SCNC: 30 MMOL/L (ref 21–32)
CREAT SERPL-MCNC: 0.8 MG/DL (ref 0.9–1.3)
EST. AVERAGE GLUCOSE BLD GHB EST-MCNC: 116.9 MG/DL
FOLATE SERPL-MCNC: >20 NG/ML (ref 4.78–24.2)
GFR SERPLBLD CREATININE-BSD FMLA CKD-EPI: >60 ML/MIN/{1.73_M2}
GLUCOSE SERPL-MCNC: 143 MG/DL (ref 70–99)
HBA1C MFR BLD: 5.7 %
LV EF: 53 %
LVEF MODALITY: NORMAL
MAGNESIUM SERPL-MCNC: 2 MG/DL (ref 1.8–2.4)
POTASSIUM SERPL-SCNC: 3.8 MMOL/L (ref 3.5–5.1)
SODIUM SERPL-SCNC: 139 MMOL/L (ref 136–145)
T4 FREE SERPL-MCNC: 1.2 NG/DL (ref 0.9–1.8)
TSH SERPL DL<=0.005 MIU/L-ACNC: 3.71 UIU/ML (ref 0.27–4.2)

## 2023-09-01 PROCEDURE — 80048 BASIC METABOLIC PNL TOTAL CA: CPT

## 2023-09-01 PROCEDURE — 6370000000 HC RX 637 (ALT 250 FOR IP)

## 2023-09-01 PROCEDURE — 36415 COLL VENOUS BLD VENIPUNCTURE: CPT

## 2023-09-01 PROCEDURE — 6360000004 HC RX CONTRAST MEDICATION: Performed by: INTERNAL MEDICINE

## 2023-09-01 PROCEDURE — 2580000003 HC RX 258: Performed by: STUDENT IN AN ORGANIZED HEALTH CARE EDUCATION/TRAINING PROGRAM

## 2023-09-01 PROCEDURE — 97116 GAIT TRAINING THERAPY: CPT

## 2023-09-01 PROCEDURE — C8929 TTE W OR WO FOL WCON,DOPPLER: HCPCS

## 2023-09-01 PROCEDURE — 83735 ASSAY OF MAGNESIUM: CPT

## 2023-09-01 RX ADMIN — ASPIRIN 81 MG: 81 TABLET, CHEWABLE ORAL at 08:18

## 2023-09-01 RX ADMIN — SODIUM CHLORIDE, PRESERVATIVE FREE 10 ML: 5 INJECTION INTRAVENOUS at 08:18

## 2023-09-01 RX ADMIN — THERA TABS 1 TABLET: TAB at 08:18

## 2023-09-01 RX ADMIN — ATENOLOL 50 MG: 50 TABLET ORAL at 08:18

## 2023-09-01 RX ADMIN — PERFLUTREN 1.5 ML: 6.52 INJECTION, SUSPENSION INTRAVENOUS at 11:57

## 2023-09-01 RX ADMIN — SODIUM CHLORIDE, PRESERVATIVE FREE 10 ML: 5 INJECTION INTRAVENOUS at 08:27

## 2023-09-01 RX ADMIN — ALLOPURINOL 300 MG: 300 TABLET ORAL at 08:18

## 2023-09-01 RX ADMIN — Medication 100 MG: at 08:18

## 2023-09-01 NOTE — PLAN OF CARE
Problem: Discharge Planning  Goal: Discharge to home or other facility with appropriate resources  9/1/2023 0757 by Ximena Dyer, APOLLO  Outcome: Progressing   Pt involved in discharge planning. Barriers to discharge discussed with patient. Discharge learning needs identified. Discuss with patient any additional needed resources and transportation plans. Case management following plan of care. Problem: Safety - Adult  Goal: Free from fall injury  9/1/2023 0757 by Ximena Dyer, APOLLO  Outcome: Progressing   All fall precautions in place. Bed locked and in lowest position with alarm on. Overbed table and personal belonings within reach. Call light within reach and patient instructed to use call light for assistance. Non-skid socks on.

## 2023-09-01 NOTE — PROGRESS NOTES
Patient is alert and oriented x4, no complaints of pain, patient up standby assistance. Regular diet. Urinal at bedside. Patient safety measures maintained. Bed alarm on, call light within reach.

## 2023-09-01 NOTE — PROGRESS NOTES
Patient discharged to home with spouse. IV removed and all belongings gathered. Patient educated on follow up care. All questions answered. Patient transported to [facility] for discharge.      Electronically signed by Leopoldo Hooks RN on 9/1/2023 at 3:59 PM

## 2023-09-01 NOTE — PROGRESS NOTES
Patient seen and examined labs and imaging reviewed; reviewed neuro note; awaiting echo results and anticipate dc today with sleep study as outpatient. Discussed case at length with patient and wife prior to discharge. If echo is abnormal will consult cardiology and hold.   Full note to follow

## 2023-09-01 NOTE — CARE COORDINATION
Case Management Assessment            Discharge Note                    Date / Time of Note: 9/1/2023 1:37 PM                  Discharge Note Completed by: Aneta Otero RN    Patient Name: Montana Clark   YOB: 1965  Diagnosis: CVA (cerebrovascular accident due to intracerebral hemorrhage) [facility]) [I61.9]  Acute CVA (cerebrovascular accident) [facility]) [I63.9]   Date / Time: 8/30/2023  8:06 PM    Current PCP: Alfred Melendez MD  Clinic patient: No    Hospitalization in the last 30 days: No       Advance Directives:  Code Status: Full Code  [place] DNR form completed and on chart: Not Indicated    Financial:  Payor: Cha Mis / Plan: Melissa Almaraz / Product Type: *No Product type* /      Pharmacy:    Safia Balderrama [id number] - [address] [phone] [facility] [phone] [address]  Phone: [phone] Fax: [phone]      Assistance purchasing medications?:    Assistance provided by Case Management: None at this time    Does patient want to participate in local refill/ meds to beds program?: Yes    Meds To Beds General Rules:  1. Can ONLY be done Monday- Friday between 8:30am-5pm  2. Prescription(s) must be in pharmacy by 3pm to be filled same day  3. Copy of patient's insurance/ prescription drug card and patient face sheet must be sent along with the prescription(s)  4. Cost of Rx cannot be added to hospital bill. If financial assistance is needed, please contact unit  or ;  or  CANNOT provide pharmacy voucher for patients co-pays  5.  Patients can then  the prescription on their way out of the hospital at discharge, or pharmacy can deliver to the bedside if staff is available. (payment due at time of pick-up or delivery - cash, check, or card accepted)     Able to afford home medications/ co-pay costs: Yes    ADLS:  Current PT AM-PAC Score: 20 /24  Current OT AM-PAC Score: 20 /24      DISCHARGE Disposition: Home- No Services Needed    LOC at discharge: Not Applicable  TEGAN Completed: Not Indicated    Notification completed in HENS/PAS?:  Not Applicable    IMM Completed:   Not Indicated due to: not medicare         Transportation:  Transportation PLAN for discharge: family   Mode of Transport: Private Car  Reason for medical transport: Not Applicable  Name of Transport Company: Not Applicable  Time of Transport: tbd    Transport form completed: Not Indicated    Home Care:  [facility] [employer] [place] [facility] ordered at discharge: Not Indicated  Home Care Agency: Not Applicable  Orders faxed: No    Durable Medical Equipment:  DME Provider: n/a  Equipment obtained during hospitalization: n/a      Additional CM Notes:  CM spoke with patient and spouse at bedside. He plans to return home with spouse, denies any CM needs. Wife to transport home. COVID Result:  No results found for: COVID19    The Plan for Transition of Care is related to the following treatment goals of CVA (cerebrovascular accident due to intracerebral hemorrhage) ([facility]) [I61.9]  Acute CVA (cerebrovascular accident) [facility]) [I63.9]    The Patient and/or patient representative Virlinda Halsted and his family were provided with a choice of provider and agrees with the discharge plan Yes    Freedom of choice list was provided with basic dialogue that supports the patient's individualized plan of care/goals and shares the quality data associated with the providers.  Yes    Care Transitions patient: No    Aneta Otero RN  [facility]  Case Management Department  Ph: [phone]  Fax: [phone]

## 2023-09-01 NOTE — PLAN OF CARE
Problem: Discharge Planning  Goal: Discharge to home or other facility with appropriate resources  Outcome: Progressing  Flowsheets (Taken 8/31/2023 1915)  Discharge to home or other facility with appropriate resources: Identify barriers to discharge with patient and caregiver     Problem: Safety - Adult  Goal: Free from fall injury  Outcome: Progressing  Flowsheets (Taken 8/31/2023 9312)  Free From Fall Injury: Instruct family/caregiver on patient safety     Problem: Neurosensory - Adult  Goal: Achieves stable or improved neurological status  Outcome: Progressing  Goal: Achieves maximal functionality and self care  Outcome: Progressing

## 2023-09-01 NOTE — PROGRESS NOTES
Physical Therapy  Facility/Department: [facility] 5T ORTHO/NEURO  Daily Treatment Note  NAME: Lisa Ceja III  : 1965  MRN: [id number]    Date of Service: 2023    Discharge Recommendations:Malachi Murphy III scored a 20/24 on the AM-PAC short mobility form. Current research shows that an AM-PAC score of 18 or greater is typically associated with a discharge to the patient's home setting. Based on the patient's AM-PAC score and their current functional mobility deficits, it is recommended that the patient have 2-3 sessions per week of Physical Therapy at d/c to increase the patient's independence. At this time, this patient demonstrates the endurance and safety to discharge home with OP services and a follow up treatment frequency of 2-3x/wk. Please see assessment section for further patient specific details. If patient discharges prior to next session this note will serve as a discharge summary. Please see below for the latest assessment towards goals. PT Equipment Recommendations  Equipment Needed: No    Patient Diagnosis(es): The encounter diagnosis was Acute CVA (cerebrovascular accident) ([address]). Assessment   Assessment: Pt with improving mobility; demonstrates willingness to ambulate but session limited by echo coming to get him. Pt with slightly impaired balance from baseline with ambulation. He would benefit from OP PT to improve his balance with functional mobility. Activity Tolerance: Patient tolerated treatment well  Equipment Needed: No     Plan    Physcial Therapy Plan  General Plan: 5-7 times per week  Current Treatment Recommendations: Strengthening;ROM;Balance training;Functional mobility training;Transfer training; Endurance training;Gait training;Stair training;Neuromuscular re-education;Home exercise program;Safety education & training;Patient/Caregiver education & training;Equipment evaluation, education, & procurement; Therapeutic activities     Restrictions  Position Activity Co-treatment   Time In 0930         Time Out 0944         Minutes 14         Timed Code Treatment Minutes: 14 Minutes   Timed Code Treatment Minutes:  14 Minutes    Total Treatment Minutes: 14 minutes       John Varghese, PT

## 2023-09-05 LAB — VIT B1 BLD-MCNC: 307 NMOL/L (ref 70–180)

## 2023-09-06 NOTE — PROGRESS NOTES
Physician Progress Note      Doc Yanes  CSN #:                  [id number]  :                       1965  ADMIT DATE:       2023 8:06 PM  [address] [place] [address] DATE:        2023 3:11 PM  RESPONDING  PROVIDER #:        Leeann Kowalski MD          QUERY TEXT:    Patient admitted with \"Acute dizziness with intense vertigo\" per MD notes on   23. Patient's MRI dated   \"No evidence of acute ischemia, intracranial hemorrhage, or mass effect. Small   areas of parenchymal signal abnormality in the right greater than left corona   radiata possibly representing small remote infarcts. If possible, please   document in the progress notes and discharge summary if CVA was: The medical record reflects the following:  Risk Factors: ?Right corona radiata hyper density, HTN, smoker  Clinical Indicators: per Ayleen Bartholomew MD notes on  -\"presented with acute onset   dizziness starting 1 hour prior to arrival (described  dizziness as just inability to keep balance). Of note, had a brief episode (20   seconds) of same type of dizziness early in the morning  that completely resolved. On evaluation, NIHSS 0, however, there was probable   direction changing nystagmus and truncal ataxia  as evidenced by wide based gait and unsteadiness walking (but able to take 3-4   steps without assistance or falling  Treatment: CBC, CMP, head CT and MRI, ECHO with bubble study (negative for   shunting, Neurology consult, Neuro. checks every  4 hours, PT/OT/ SLP, ECHO with bubble study, \"Start ASA and high intensity   statin for secondary prevention given remote stroke\"  Options provided:  -- CVA treated and resolved  -- CVA ruled out after study  -- TIA treated and resolved  -- Other - I will add my own diagnosis  -- Disagree - Not applicable / Not valid  -- Disagree - Clinically unable to determine / Unknown  -- Refer to Clinical Documentation Reviewer    PROVIDER RESPONSE TEXT:    CVA treated and resolved.     Query created

## 2023-09-22 ENCOUNTER — TELEPHONE (OUTPATIENT)
Dept: ICU | Age: 58
End: 2023-09-22

## 2023-09-22 NOTE — TELEPHONE ENCOUNTER
Neurology recommendations were made to start aspirin 81 mg and atorvastatin 80 mg during 8/30/2023 admission - not ordered on discharging AVS. Needing to verify patient has scheduled follow up appointment with PCP to discuss need for stroke preventative medications.

## 2023-10-30 ENCOUNTER — HOSPITAL ENCOUNTER (EMERGENCY)
Age: 58
Discharge: HOME | End: 2023-10-30
Attending: EMERGENCY MEDICINE
Payer: COMMERCIAL

## 2023-10-30 ENCOUNTER — APPOINTMENT (OUTPATIENT)
Dept: GENERAL RADIOLOGY | Age: 58
End: 2023-10-30
Payer: COMMERCIAL

## 2023-10-30 ENCOUNTER — NURSE ONLY (OUTPATIENT)
Dept: CARDIOLOGY | Age: 58
End: 2023-10-30

## 2023-10-30 ENCOUNTER — APPOINTMENT (OUTPATIENT)
Dept: CT IMAGING | Age: 58
End: 2023-10-30
Payer: COMMERCIAL

## 2023-10-30 ENCOUNTER — APPOINTMENT (OUTPATIENT)
Dept: MRI IMAGING | Age: 58
End: 2023-10-30
Payer: COMMERCIAL

## 2023-10-30 VITALS
OXYGEN SATURATION: 97 % | HEIGHT: 74 IN | TEMPERATURE: 98.2 F | RESPIRATION RATE: 16 BRPM | SYSTOLIC BLOOD PRESSURE: 144 MMHG | HEART RATE: 64 BPM | DIASTOLIC BLOOD PRESSURE: 82 MMHG | WEIGHT: 266.9 LBS | BODY MASS INDEX: 34.25 KG/M2

## 2023-10-30 DIAGNOSIS — R42 DIZZINESS: ICD-10-CM

## 2023-10-30 DIAGNOSIS — R20.2 PARESTHESIA: Primary | ICD-10-CM

## 2023-10-30 LAB
ANION GAP SERPL CALCULATED.3IONS-SCNC: 13 MMOL/L (ref 3–16)
BACTERIA URNS QL MICRO: ABNORMAL /HPF
BASOPHILS # BLD: 0.1 K/UL (ref 0–0.2)
BASOPHILS NFR BLD: 0.7 %
BILIRUB UR QL STRIP.AUTO: NEGATIVE
BUN SERPL-MCNC: 9 MG/DL (ref 7–20)
CALCIUM SERPL-MCNC: 9.6 MG/DL (ref 8.3–10.6)
CHLORIDE SERPL-SCNC: 101 MMOL/L (ref 99–110)
CLARITY UR: CLEAR
CO2 SERPL-SCNC: 24 MMOL/L (ref 21–32)
COLOR UR: YELLOW
CREAT SERPL-MCNC: 0.7 MG/DL (ref 0.9–1.3)
DEPRECATED RDW RBC AUTO: 13.1 % (ref 12.4–15.4)
EKG ATRIAL RATE: 65 BPM
EKG DIAGNOSIS: NORMAL
EKG P AXIS: 18 DEGREES
EKG P-R INTERVAL: 166 MS
EKG Q-T INTERVAL: 390 MS
EKG QRS DURATION: 98 MS
EKG QTC CALCULATION (BAZETT): 405 MS
EKG R AXIS: 52 DEGREES
EKG T AXIS: 71 DEGREES
EKG VENTRICULAR RATE: 65 BPM
EOSINOPHIL # BLD: 0.2 K/UL (ref 0–0.6)
EOSINOPHIL NFR BLD: 2.6 %
GFR SERPLBLD CREATININE-BSD FMLA CKD-EPI: >60 ML/MIN/{1.73_M2}
GLUCOSE BLD-MCNC: 192 MG/DL (ref 70–99)
GLUCOSE SERPL-MCNC: 159 MG/DL (ref 70–99)
GLUCOSE UR STRIP.AUTO-MCNC: NEGATIVE MG/DL
HCT VFR BLD AUTO: 43.1 % (ref 40.5–52.5)
HGB BLD-MCNC: 15 G/DL (ref 13.5–17.5)
HGB UR QL STRIP.AUTO: ABNORMAL
KETONES UR STRIP.AUTO-MCNC: NEGATIVE MG/DL
LEUKOCYTE ESTERASE UR QL STRIP.AUTO: NEGATIVE
LYMPHOCYTES # BLD: 1.7 K/UL (ref 1–5.1)
LYMPHOCYTES NFR BLD: 18.7 %
MCH RBC QN AUTO: 33 PG (ref 26–34)
MCHC RBC AUTO-ENTMCNC: 34.9 G/DL (ref 31–36)
MCV RBC AUTO: 94.7 FL (ref 80–100)
MONOCYTES # BLD: 0.6 K/UL (ref 0–1.3)
MONOCYTES NFR BLD: 6.4 %
NEUTROPHILS # BLD: 6.4 K/UL (ref 1.7–7.7)
NEUTROPHILS NFR BLD: 71.6 %
NITRITE UR QL STRIP.AUTO: NEGATIVE
PERFORMED ON: ABNORMAL
PH UR STRIP.AUTO: 6.5 [PH] (ref 5–8)
PLATELET # BLD AUTO: 214 K/UL (ref 135–450)
PMV BLD AUTO: 8.7 FL (ref 5–10.5)
POTASSIUM SERPL-SCNC: 4 MMOL/L (ref 3.5–5.1)
PROT UR STRIP.AUTO-MCNC: NEGATIVE MG/DL
RBC # BLD AUTO: 4.55 M/UL (ref 4.2–5.9)
RBC #/AREA URNS HPF: ABNORMAL /HPF (ref 0–4)
SODIUM SERPL-SCNC: 138 MMOL/L (ref 136–145)
SP GR UR STRIP.AUTO: 1.01 (ref 1–1.03)
TROPONIN, HIGH SENSITIVITY: <6 NG/L (ref 0–22)
UA COMPLETE W REFLEX CULTURE PNL UR: ABNORMAL
UA DIPSTICK W REFLEX MICRO PNL UR: YES
URN SPEC COLLECT METH UR: ABNORMAL
UROBILINOGEN UR STRIP-ACNC: 0.2 E.U./DL
WBC # BLD AUTO: 8.9 K/UL (ref 4–11)
WBC #/AREA URNS HPF: ABNORMAL /HPF (ref 0–5)

## 2023-10-30 PROCEDURE — 99285 EMERGENCY DEPT VISIT HI MDM: CPT

## 2023-10-30 PROCEDURE — 93005 ELECTROCARDIOGRAM TRACING: CPT | Performed by: STUDENT IN AN ORGANIZED HEALTH CARE EDUCATION/TRAINING PROGRAM

## 2023-10-30 PROCEDURE — 70551 MRI BRAIN STEM W/O DYE: CPT

## 2023-10-30 PROCEDURE — 36415 COLL VENOUS BLD VENIPUNCTURE: CPT

## 2023-10-30 PROCEDURE — 85025 COMPLETE CBC W/AUTO DIFF WBC: CPT

## 2023-10-30 PROCEDURE — 84484 ASSAY OF TROPONIN QUANT: CPT

## 2023-10-30 PROCEDURE — 80048 BASIC METABOLIC PNL TOTAL CA: CPT

## 2023-10-30 PROCEDURE — 81001 URINALYSIS AUTO W/SCOPE: CPT

## 2023-10-30 PROCEDURE — 99222 1ST HOSP IP/OBS MODERATE 55: CPT | Performed by: NURSE PRACTITIONER

## 2023-10-30 PROCEDURE — 71046 X-RAY EXAM CHEST 2 VIEWS: CPT

## 2023-10-30 ASSESSMENT — PAIN - FUNCTIONAL ASSESSMENT: PAIN_FUNCTIONAL_ASSESSMENT: NONE - DENIES PAIN

## 2023-10-30 NOTE — CONSULTS
NEUROLOGY / NEUROCRITICAL CARE CONSULT NOTE       Patient: Alec Benson MRN: [de-identified]    YOB: 1965  Age: 66 y.o. Sex: male   Unit: [facility] EMERGENCY DEPT Room/Bed: A08/A08-08 Location: [address] [place] [address]    Date of Consultation: 10/30/2023  Date of Admission: 10/30/2023  9:48 AM ( LOS: 0 days )  Admitting Physician:     Primary Care Physician: Annette Altman MD   Consult Requested By: Juan Hillman    Reason for Consult: \"TIA\"         IMPRESSION & RECOMMENDATIONS     IMPRESSION:  Patient is a 65 y/o M w/ lightheaded episode earlier today. Has had these events at least once a day for a while. He states they typically last about 10 seconds, this one was while he was at work and lasted longer. Had prior admission for TIA - ECHO w/ good EF, no PFO  On ASA and lipitor   Prior LDL 93, prior A1C was WNL  Saw Dr. Liz Potter in follow up  MRI completed today was negative for acute stroke    RECOMMENDATIONS / PLAN:  Recommend cardiac monitor for 30 days - notified cardiac nurse  No need for further inpatient w/u unless needed from a cardiac standpoint  Continue taking ASA and statin   Continue secondary stroke prevention   Can follow up w/ PCP for secondary stroke prevention  Can f/u w/ Dr. Erasmo Villagran as needed   We will sign off. Okay for discharge from neurology standpoint      Management and plan discussed with:  Nursing staff  Dr. Gema Del Castillo, APRN - CNP   Neurology & Neurocritical Care   10/30/2023 4:45 PM    History of Present Illness     Yakelin Macdonald III is a 66 y.o. y/o male with Prior TIA, HTN, HLD who presents with lightheadedness that occurred while he was working today and was associated w/ numbness in bilateral armpit areas. All symptoms have resolved. They had resolved prior to presenting to hospital. He states he has this occur nearly daily, but typically only last for a few seconds.  He had prior admission w/ TIA w/u, he has old stroke on MRI, but no new

## 2023-10-30 NOTE — DISCHARGE INSTRUCTIONS
CAM MONITOR INSTRUCTIONS    Patient instructions for CAM monitor: You will need to wear monitor for 2 weeks. Mail monitor back or return to office on following date. Remove date:  [address]  [address]  [address] [place] [address] [facility],[address]  [phone]         Make sure to save box as you will place monitor back in postage paid box to return to company. After removing monitor stick it to template provided, place both your log and monitor in box and place in mailbox. If monitor comes off but has been in place at least 7 days place in box and mail back. If it comes off sooner than 7 days you will have to call office and return to have it replaced. Avoid excess sweating to maximize wear time. You are able to shower after 24 hours, however have majority of water hitting back and not directly on monitor. Do not submerge in bath. If you experience any symptoms while wearing monitor push button and record in booklet.       For questions about monitor call: Customer Service [phone]

## 2023-10-30 NOTE — ED NOTES
.Patient prepared for and ready to be discharged. Patient discharged at this time to home in care of self in no acute distress after verbalizing understanding of discharge instructions. Patient left after receiving After Visit Summary instructions. IV removed prior to discharge.      Anton Donald RN  10/30/23 8981

## 2023-10-30 NOTE — PROGRESS NOTES
2 week CAM monitor # [id number] applied per order Douglas Griffith CNP for TIA . Instructions given and questions answered. Bedside nurse aware. Please sent results to neuro group and patients PCP Stewart Sood at [facility].

## 2023-10-31 PROBLEM — R42 LIGHTHEADEDNESS: Status: ACTIVE | Noted: 2023-10-31

## 2023-11-10 ENCOUNTER — HOSPITAL ENCOUNTER (EMERGENCY)
Age: 58
Discharge: HOME | End: 2023-11-10
Attending: EMERGENCY MEDICINE
Payer: COMMERCIAL

## 2023-11-10 ENCOUNTER — APPOINTMENT (OUTPATIENT)
Dept: GENERAL RADIOLOGY | Age: 58
End: 2023-11-10
Payer: COMMERCIAL

## 2023-11-10 VITALS
SYSTOLIC BLOOD PRESSURE: 134 MMHG | BODY MASS INDEX: 35.12 KG/M2 | RESPIRATION RATE: 18 BRPM | OXYGEN SATURATION: 100 % | WEIGHT: 265 LBS | TEMPERATURE: 98.3 F | HEART RATE: 74 BPM | DIASTOLIC BLOOD PRESSURE: 80 MMHG | HEIGHT: 73 IN

## 2023-11-10 DIAGNOSIS — R47.9 DIFFICULTY WITH SPEECH: ICD-10-CM

## 2023-11-10 DIAGNOSIS — E16.2 HYPOGLYCEMIA: Primary | ICD-10-CM

## 2023-11-10 DIAGNOSIS — R26.81 UNSTEADY GAIT: ICD-10-CM

## 2023-11-10 LAB
ALBUMIN SERPL-MCNC: 4.5 G/DL (ref 3.4–5)
ALBUMIN/GLOB SERPL: 1.6 {RATIO} (ref 1.1–2.2)
ALP SERPL-CCNC: 123 U/L (ref 40–129)
ALT SERPL-CCNC: 27 U/L (ref 10–40)
ANION GAP SERPL CALCULATED.3IONS-SCNC: 10 MMOL/L (ref 3–16)
AST SERPL-CCNC: 23 U/L (ref 15–37)
BASOPHILS # BLD: 0.1 K/UL (ref 0–0.2)
BASOPHILS NFR BLD: 0.8 %
BILIRUB SERPL-MCNC: 0.6 MG/DL (ref 0–1)
BILIRUB UR QL STRIP.AUTO: NEGATIVE
BUN SERPL-MCNC: 13 MG/DL (ref 7–20)
CALCIUM SERPL-MCNC: 10.1 MG/DL (ref 8.3–10.6)
CHLORIDE SERPL-SCNC: 102 MMOL/L (ref 99–110)
CHP ED QC CHECK: 159
CLARITY UR: CLEAR
CO2 SERPL-SCNC: 28 MMOL/L (ref 21–32)
COLOR UR: YELLOW
CREAT SERPL-MCNC: 0.9 MG/DL (ref 0.9–1.3)
DEPRECATED RDW RBC AUTO: 13 % (ref 12.4–15.4)
EOSINOPHIL # BLD: 0.4 K/UL (ref 0–0.6)
EOSINOPHIL NFR BLD: 4.5 %
ETHANOLAMINE SERPL-MCNC: NORMAL MG/DL (ref 0–0.08)
GFR SERPLBLD CREATININE-BSD FMLA CKD-EPI: >60 ML/MIN/{1.73_M2}
GLUCOSE BLD-MCNC: 150 MG/DL (ref 70–99)
GLUCOSE BLD-MCNC: 159 MG/DL (ref 70–99)
GLUCOSE SERPL-MCNC: 148 MG/DL (ref 70–99)
GLUCOSE UR STRIP.AUTO-MCNC: NEGATIVE MG/DL
HCT VFR BLD AUTO: 42.3 % (ref 40.5–52.5)
HGB BLD-MCNC: 15 G/DL (ref 13.5–17.5)
HGB UR QL STRIP.AUTO: ABNORMAL
HYALINE CASTS #/AREA URNS LPF: ABNORMAL /LPF (ref 0–2)
INR PPP: 0.94 (ref 0.84–1.16)
KETONES UR STRIP.AUTO-MCNC: NEGATIVE MG/DL
LACTATE BLDV-SCNC: 1.1 MMOL/L (ref 0.4–2)
LEUKOCYTE ESTERASE UR QL STRIP.AUTO: NEGATIVE
LYMPHOCYTES # BLD: 2.3 K/UL (ref 1–5.1)
LYMPHOCYTES NFR BLD: 28.5 %
MAGNESIUM SERPL-MCNC: 2 MG/DL (ref 1.8–2.4)
MCH RBC QN AUTO: 33.1 PG (ref 26–34)
MCHC RBC AUTO-ENTMCNC: 35.4 G/DL (ref 31–36)
MCV RBC AUTO: 93.6 FL (ref 80–100)
MONOCYTES # BLD: 0.8 K/UL (ref 0–1.3)
MONOCYTES NFR BLD: 9.9 %
MUCOUS THREADS #/AREA URNS LPF: ABNORMAL /LPF
NEUTROPHILS # BLD: 4.5 K/UL (ref 1.7–7.7)
NEUTROPHILS NFR BLD: 56.3 %
NITRITE UR QL STRIP.AUTO: NEGATIVE
PERFORMED ON: ABNORMAL
PERFORMED ON: ABNORMAL
PH UR STRIP.AUTO: 5.5 [PH] (ref 5–8)
PLATELET # BLD AUTO: 192 K/UL (ref 135–450)
PMV BLD AUTO: 8.7 FL (ref 5–10.5)
POTASSIUM SERPL-SCNC: 4 MMOL/L (ref 3.5–5.1)
PROT SERPL-MCNC: 7.3 G/DL (ref 6.4–8.2)
PROT UR STRIP.AUTO-MCNC: ABNORMAL MG/DL
PROTHROMBIN TIME: 12.6 SEC (ref 11.5–14.8)
RBC # BLD AUTO: 4.51 M/UL (ref 4.2–5.9)
RBC #/AREA URNS HPF: ABNORMAL /HPF (ref 0–4)
SODIUM SERPL-SCNC: 140 MMOL/L (ref 136–145)
SP GR UR STRIP.AUTO: <=1.005 (ref 1–1.03)
TROPONIN, HIGH SENSITIVITY: 7 NG/L (ref 0–22)
UA COMPLETE W REFLEX CULTURE PNL UR: ABNORMAL
UA DIPSTICK W REFLEX MICRO PNL UR: YES
URN SPEC COLLECT METH UR: ABNORMAL
UROBILINOGEN UR STRIP-ACNC: 0.2 E.U./DL
WBC # BLD AUTO: 8 K/UL (ref 4–11)
WBC #/AREA URNS HPF: ABNORMAL /HPF (ref 0–5)

## 2023-11-10 PROCEDURE — 99285 EMERGENCY DEPT VISIT HI MDM: CPT

## 2023-11-10 PROCEDURE — 71045 X-RAY EXAM CHEST 1 VIEW: CPT

## 2023-11-10 PROCEDURE — 82077 ASSAY SPEC XCP UR&BREATH IA: CPT

## 2023-11-10 PROCEDURE — 83735 ASSAY OF MAGNESIUM: CPT

## 2023-11-10 PROCEDURE — 84484 ASSAY OF TROPONIN QUANT: CPT

## 2023-11-10 PROCEDURE — 83605 ASSAY OF LACTIC ACID: CPT

## 2023-11-10 PROCEDURE — 85610 PROTHROMBIN TIME: CPT

## 2023-11-10 PROCEDURE — 85025 COMPLETE CBC W/AUTO DIFF WBC: CPT

## 2023-11-10 PROCEDURE — 80053 COMPREHEN METABOLIC PANEL: CPT

## 2023-11-10 PROCEDURE — 93005 ELECTROCARDIOGRAM TRACING: CPT | Performed by: EMERGENCY MEDICINE

## 2023-11-10 PROCEDURE — 36415 COLL VENOUS BLD VENIPUNCTURE: CPT

## 2023-11-10 PROCEDURE — 2580000003 HC RX 258: Performed by: EMERGENCY MEDICINE

## 2023-11-10 PROCEDURE — 81001 URINALYSIS AUTO W/SCOPE: CPT

## 2023-11-10 RX ORDER — 0.9 % SODIUM CHLORIDE 0.9 %
500 INTRAVENOUS SOLUTION INTRAVENOUS ONCE
Status: COMPLETED | OUTPATIENT
Start: 2023-11-10 | End: 2023-11-10

## 2023-11-10 RX ADMIN — SODIUM CHLORIDE 500 ML: 9 INJECTION, SOLUTION INTRAVENOUS at 06:35

## 2023-11-10 ASSESSMENT — ENCOUNTER SYMPTOMS
ABDOMINAL PAIN: 0
PHOTOPHOBIA: 0
SHORTNESS OF BREATH: 0

## 2023-11-10 ASSESSMENT — PAIN - FUNCTIONAL ASSESSMENT
PAIN_FUNCTIONAL_ASSESSMENT: NONE - DENIES PAIN

## 2023-11-10 NOTE — ED PROVIDER NOTES
[facility]        Pt Name: Tony Mullen  MRN: [de-identified]  [address] [facility] [place] 1965  Date of evaluation: 11/10/2023  Provider: Gurvinder Harley MD  PCP: Satnam Stoner MD      CHIEF COMPLAINT       Chief Complaint   Patient presents with    Hypoglycemia     Pt via squad for \"stroke like symptoms\" reported by pt's wife after waking up this AM at [phone]. BGL 58 per squad upon arrival.  Oral glucose adm. 159 here. NIH 0   Trouble with speech, unsteady gait    HISTORY OFPRESENT ILLNESS   (Location/Symptom, Timing/Onset, Context/Setting, Quality, Duration, Modifying Factors,Severity)  Note limiting factors. Tony Mullen is a 66 y.o. male presenting today due to concern for being called out due to concern for possibility of a stroke. EMS reports when they arrived his glucose was 56 and ultimately did receive oral glucose due to this. He went to bed at 9:30 PM and woke up at 4:10 AM with the symptoms at that time. He reportedly has a history of TIAs. He states that he normally has a speech impediment and did seem to be speaking to me without any issues even with this reported history. He denies any chest pain. He states in the past he has had issues with high glucose but has never been told he had hypoglycemia. EMS does report he was able to walk on the scene but was unsteady with his gait. He does take a baby aspirin but no blood thinners. Due to concern for possible strokelike symptoms, he was brought to the ED for further evaluation. I did evaluate him initially at [phone] right when he came in to the ED from EMS. REVIEW OF SYSTEMS    (2-9 systems for level 4, 10 or more for level 5)     Review of Systems   Constitutional:  Negative for fatigue and fever. Eyes:  Negative for photophobia and visual disturbance. Respiratory:  Negative for shortness of breath. Cardiovascular:  Negative for chest pain.    Gastrointestinal:  Negative for

## 2023-11-10 NOTE — ED PROVIDER NOTES
This patient was signed out to me at 8:10 AM. Please reference the off going provider's note for initial assessment and plan. This patient is signed out pending the following:    - reassess clinical condition  - review workup  - determine disposition      Special discussion: none    The remainder of the patient's ED course is as follows:    ED Course as of 11/10/23 0810   Fri Nov 10, 2023   0807 The patient is reassessed and comfortably no acute distress remains hemodynamically stable. Blood sugar trend has been stable. On repeat examination his condition is benign. Per family at bedside he is in his baseline condition particular with his speech difficulties. He is wearing a cardiac event monitor and has had no notifications. The patient has been experiencing similar episodes recently and has undergone evaluation for TIA as well as a cardiogenic cause which has been overall unrevealing and he reports that at these times he has been identified previously with low blood sugar. His course this morning has been reassuring and overall would favor sequelae of hypoglycemia. At this time low suspicion for high risk CNS or cardiopulmonary etiology. We discussed the patient's course at bedside and the patient is overall reassured by his condition and given this there is no indication at this time for further testing or treatment in the emergency department and the patient is appropriate for discharge.  [NG]      ED Course User Index  [NG] MD Suzanne Andre MD  11/10/23 5036

## 2023-11-10 NOTE — ED NOTES
Ambulated with a steady gait.   Sts \"still feel lightheaded, but better than it was\"     Edgar Bruno RN  11/10/23 0516

## 2023-11-10 NOTE — DISCHARGE INSTRUCTIONS
You were evaluated in the emergency department for low blood sugar. Assessments and testing completed during your visit were reassuring and at this time there is no indication for further testing, treatment or admission to the hospital. Given this it is appropriate to discharge you from the emergency department. At the time of discharge we discussed the following:    Please review this visit to the emerge doctor for further recommendations. Please note that sometimes it is difficult to diagnose a medical condition early in the disease process before the disease is fully manifest. Because of this, should you develop any new or worsening symptoms, you may return at any time to the emergency department for another evaluation. If available you are also recommended to review this visit with your primary care physician or other medical provider in the next 7 days. Thank you for allowing us to care for you today.

## 2023-11-11 LAB
EKG ATRIAL RATE: 70 BPM
EKG DIAGNOSIS: NORMAL
EKG P AXIS: 71 DEGREES
EKG P-R INTERVAL: 156 MS
EKG Q-T INTERVAL: 384 MS
EKG QRS DURATION: 90 MS
EKG QTC CALCULATION (BAZETT): 414 MS
EKG R AXIS: 72 DEGREES
EKG T AXIS: 77 DEGREES
EKG VENTRICULAR RATE: 70 BPM

## 2023-11-11 PROCEDURE — 93010 ELECTROCARDIOGRAM REPORT: CPT | Performed by: INTERNAL MEDICINE

## 2023-11-24 DIAGNOSIS — I49.8 OTHER CARDIAC ARRHYTHMIA: Primary | ICD-10-CM
